# Patient Record
Sex: MALE | Race: WHITE | NOT HISPANIC OR LATINO
[De-identification: names, ages, dates, MRNs, and addresses within clinical notes are randomized per-mention and may not be internally consistent; named-entity substitution may affect disease eponyms.]

---
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
## 2021-05-25 ENCOUNTER — APPOINTMENT (OUTPATIENT)
Dept: PHYSICAL MEDICINE AND REHAB | Facility: CLINIC | Age: 21
End: 2021-05-25

## 2022-06-14 ENCOUNTER — APPOINTMENT (OUTPATIENT)
Dept: PHYSICAL MEDICINE AND REHAB | Facility: CLINIC | Age: 22
End: 2022-06-14

## 2022-12-19 ENCOUNTER — APPOINTMENT (OUTPATIENT)
Dept: PHYSICAL MEDICINE AND REHAB | Facility: CLINIC | Age: 22
End: 2022-12-19

## 2022-12-19 DIAGNOSIS — K59.2 NEUROGENIC BOWEL, NOT ELSEWHERE CLASSIFIED: ICD-10-CM

## 2022-12-19 DIAGNOSIS — G82.50 QUADRIPLEGIA, UNSPECIFIED: ICD-10-CM

## 2022-12-19 DIAGNOSIS — R25.2 CRAMP AND SPASM: ICD-10-CM

## 2022-12-19 DIAGNOSIS — N31.9 NEUROMUSCULAR DYSFUNCTION OF BLADDER, UNSPECIFIED: ICD-10-CM

## 2022-12-19 PROCEDURE — 99204 OFFICE O/P NEW MOD 45 MIN: CPT | Mod: 25

## 2022-12-19 PROCEDURE — 36415 COLL VENOUS BLD VENIPUNCTURE: CPT

## 2022-12-20 PROBLEM — N31.9 NEUROGENIC BLADDER: Status: ACTIVE | Noted: 2017-08-21

## 2022-12-20 LAB
ALBUMIN SERPL ELPH-MCNC: 4.6 G/DL
ALP BLD-CCNC: 50 U/L
ALT SERPL-CCNC: 28 U/L
ANION GAP SERPL CALC-SCNC: 9 MMOL/L
AST SERPL-CCNC: 23 U/L
BASOPHILS # BLD AUTO: 0.03 K/UL
BASOPHILS NFR BLD AUTO: 0.5 %
BILIRUB SERPL-MCNC: 0.7 MG/DL
BUN SERPL-MCNC: 14 MG/DL
CALCIUM SERPL-MCNC: 9.7 MG/DL
CHLORIDE SERPL-SCNC: 105 MMOL/L
CHOLEST SERPL-MCNC: 186 MG/DL
CO2 SERPL-SCNC: 24 MMOL/L
CREAT SERPL-MCNC: 0.75 MG/DL
EGFR: 132 ML/MIN/1.73M2
EOSINOPHIL # BLD AUTO: 0.05 K/UL
EOSINOPHIL NFR BLD AUTO: 0.8 %
GLUCOSE SERPL-MCNC: 93 MG/DL
HCT VFR BLD CALC: 43.4 %
HDLC SERPL-MCNC: 69 MG/DL
HGB BLD-MCNC: 14.4 G/DL
IMM GRANULOCYTES NFR BLD AUTO: 0.2 %
LDLC SERPL CALC-MCNC: 98 MG/DL
LYMPHOCYTES # BLD AUTO: 1.46 K/UL
LYMPHOCYTES NFR BLD AUTO: 24.8 %
MAN DIFF?: NORMAL
MCHC RBC-ENTMCNC: 29.3 PG
MCHC RBC-ENTMCNC: 33.2 GM/DL
MCV RBC AUTO: 88.2 FL
MONOCYTES # BLD AUTO: 0.4 K/UL
MONOCYTES NFR BLD AUTO: 6.8 %
NEUTROPHILS # BLD AUTO: 3.94 K/UL
NEUTROPHILS NFR BLD AUTO: 66.9 %
NONHDLC SERPL-MCNC: 116 MG/DL
PLATELET # BLD AUTO: 224 K/UL
POTASSIUM SERPL-SCNC: 4.1 MMOL/L
PROT SERPL-MCNC: 7.2 G/DL
RBC # BLD: 4.92 M/UL
RBC # FLD: 13.9 %
SODIUM SERPL-SCNC: 139 MMOL/L
TRIGL SERPL-MCNC: 92 MG/DL
TSH SERPL-ACNC: 0.71 UIU/ML
WBC # FLD AUTO: 5.89 K/UL

## 2022-12-20 RX ORDER — NORMAL SALT TABLETS 1 G/G
1 TABLET ORAL DAILY
Qty: 60 | Refills: 5 | Status: DISCONTINUED | COMMUNITY
Start: 2019-01-07 | End: 2022-12-20

## 2022-12-20 RX ORDER — D-MANNOSE 99 %
POWDER (GRAM) MISCELLANEOUS
Refills: 0 | Status: DISCONTINUED | COMMUNITY
Start: 2017-08-21 | End: 2022-12-20

## 2022-12-20 NOTE — HISTORY OF PRESENT ILLNESS
[FreeTextEntry1] : 22 year old man w/ C7 incomplete tetraplegia since age 14 presents for follow up. Since last visit in January of 2019, the patient has made remarkable strides in his emotional wellbeing. He states that he is happier than ever, including prior to his injury, owing to meditation, physical exercise and cutting negative influences out of his life. He completed 1 semester of college and is training for the paralympic games, recognizing that this lofty goal will require constant commitment. Physically, he has been relatively well in the past few years, and has made the necessary adjustments to maximize his wellbeing. He is essentially able to care for himself independently, and has not had any recent bladder or bowel problems. The only medication he remains on is baclofen, 40 mg twice daily. He did not feel that the neurogenic bladder regimen using both oxybutinin and mirabegron in the past was practical to continue as it only provided him with a minute or two of extra time to hold in urine prior to cathing. Over this span, he was once hospitalized with cellulitis and incidentally discovered to have COVID-19 at that time, both of which resolved and have not resurfaced.

## 2022-12-20 NOTE — SOCIAL HISTORY
[Brother] : brother [Private Home] : in a private home [Stairs to enter?] : no stairs to enter [Manual Wheelchair] : manual wheelchair [FreeTextEntry1] : lives in his childhood home with his younger brother.  [FreeTextEntry6] : Drives a modified vehicle.

## 2022-12-20 NOTE — REASON FOR VISIT
[Initial Eval - Existing Diagnosis] : an initial evaluation of an existing diagnosis [FreeTextEntry1] : tetraplegia.

## 2022-12-20 NOTE — PHYSICAL EXAM
[Right: _____] : Right: [unfilled] [Left: _____] : Left: [unfilled] [Normal] : Oriented to person, place, and time, insight and judgement were intact and the affect was normal [de-identified] : Fingers in flexion with good tenodesis. Transfers indep. Mat mob indep. WC indep.  [de-identified] : see below. Very spastic but uses body position to control and break strong spasms effectively.  [5] : C7  elbow extension 5/5 [0] : L3  knee extension 0/5 [1] : S1 ankle flexors 1/5

## 2022-12-20 NOTE — ASSESSMENT
[FreeTextEntry1] : 22 year old man with C7 AIS C tetraplegia.\par \par Praised for weight loss, psychological progress, engagement in paralympic endeavors \par \par 1. US kidney and bladder, eval for stones and hydro.\par 2. DEXA spine and hips. \par 3. CBC, BMP, TSH, lipids. \par 4. Continue with IC using  Speedicaths to maintain continence.\par 5. Encouraged to follow up on annual or semi-annual basis. \par 6. After informed consent administered flu vaccine left deltoid without reaction.\par 7. Social work eval to help with SSI and consider counseling. \par \par Add: labs normal. Patient informed.

## 2022-12-20 NOTE — END OF VISIT
[] : Resident [FreeTextEntry3] : I was physically present for key portion of history and physical exam and I directed medical management as documented by Dr. Franco and modified where appropriate.  [Time Spent: ___ minutes] : I have spent [unfilled] minutes of time on the encounter.

## 2022-12-20 NOTE — REVIEW OF SYSTEMS
[Fever] : no fever [Chills] : no chills [Chest Pain] : no chest pain [Palpitations] : no palpitations [Leg Claudication] : no intermittent leg claudication [Lower Ext Edema] : no lower extremity edema [Joint Pain] : no joint pain [Joint Stiffness] : no joint stiffness [Muscle Pain] : no muscle pain [Skin Rash] : no skin rash [Skin Wound] : no skin wound [Headache] : no headaches [Dizziness] : no dizziness [Negative] : Heme/Lymph [FreeTextEntry2] : Increased fitness. [FreeTextEntry5] : Orthostasis resolved, no longer uses salt tabs. [FreeTextEntry7] : BM every other day w/ digital stimulation, going well though routine takes a long time to ensure no bowel accident.  [FreeTextEntry8] : Self catheterizes 7-10 times per day.  [de-identified] : Stable incomplete tetraplegia. Dizziness reported in previous visits no longer bothersome. [de-identified] : Optimistic, positive mentality

## 2023-01-04 ENCOUNTER — NON-APPOINTMENT (OUTPATIENT)
Age: 23
End: 2023-01-04

## 2023-01-19 ENCOUNTER — APPOINTMENT (OUTPATIENT)
Dept: UROLOGY | Facility: CLINIC | Age: 23
End: 2023-01-19

## 2023-08-21 ENCOUNTER — EMERGENCY (EMERGENCY)
Facility: HOSPITAL | Age: 23
LOS: 1 days | Discharge: HOME | End: 2023-08-21
Attending: STUDENT IN AN ORGANIZED HEALTH CARE EDUCATION/TRAINING PROGRAM
Payer: COMMERCIAL

## 2023-08-21 VITALS
RESPIRATION RATE: 18 BRPM | HEART RATE: 88 BPM | DIASTOLIC BLOOD PRESSURE: 55 MMHG | WEIGHT: 169.98 LBS | SYSTOLIC BLOOD PRESSURE: 113 MMHG | OXYGEN SATURATION: 98 %

## 2023-08-21 DIAGNOSIS — S14.105A UNSPECIFIED INJURY AT C5 LEVEL OF CERVICAL SPINAL CORD, INITIAL ENCOUNTER: Chronic | ICD-10-CM

## 2023-08-21 LAB
ALBUMIN SERPL ELPH-MCNC: 4.3 G/DL — SIGNIFICANT CHANGE UP (ref 3.3–5)
ALP SERPL-CCNC: 57 U/L — SIGNIFICANT CHANGE UP (ref 40–120)
ALT FLD-CCNC: 23 U/L — SIGNIFICANT CHANGE UP (ref 10–45)
ANION GAP SERPL CALC-SCNC: 13 MMOL/L — SIGNIFICANT CHANGE UP (ref 5–17)
AST SERPL-CCNC: 22 U/L — SIGNIFICANT CHANGE UP (ref 10–40)
BASOPHILS # BLD AUTO: 0.02 K/UL — SIGNIFICANT CHANGE UP (ref 0–0.2)
BASOPHILS NFR BLD AUTO: 0.3 % — SIGNIFICANT CHANGE UP (ref 0–2)
BILIRUB SERPL-MCNC: 0.5 MG/DL — SIGNIFICANT CHANGE UP (ref 0.2–1.2)
BUN SERPL-MCNC: 15 MG/DL — SIGNIFICANT CHANGE UP (ref 7–23)
CALCIUM SERPL-MCNC: 9.6 MG/DL — SIGNIFICANT CHANGE UP (ref 8.4–10.5)
CHLORIDE SERPL-SCNC: 105 MMOL/L — SIGNIFICANT CHANGE UP (ref 96–108)
CO2 SERPL-SCNC: 22 MMOL/L — SIGNIFICANT CHANGE UP (ref 22–31)
CREAT SERPL-MCNC: 0.71 MG/DL — SIGNIFICANT CHANGE UP (ref 0.5–1.3)
CRP SERPL-MCNC: 16 MG/L — HIGH (ref 0–4)
EGFR: 133 ML/MIN/1.73M2 — SIGNIFICANT CHANGE UP
EOSINOPHIL # BLD AUTO: 0.09 K/UL — SIGNIFICANT CHANGE UP (ref 0–0.5)
EOSINOPHIL NFR BLD AUTO: 1.3 % — SIGNIFICANT CHANGE UP (ref 0–6)
GLUCOSE SERPL-MCNC: 109 MG/DL — HIGH (ref 70–99)
HCT VFR BLD CALC: 40.6 % — SIGNIFICANT CHANGE UP (ref 39–50)
HGB BLD-MCNC: 13.5 G/DL — SIGNIFICANT CHANGE UP (ref 13–17)
IMM GRANULOCYTES NFR BLD AUTO: 0.3 % — SIGNIFICANT CHANGE UP (ref 0–0.9)
LYMPHOCYTES # BLD AUTO: 1.83 K/UL — SIGNIFICANT CHANGE UP (ref 1–3.3)
LYMPHOCYTES # BLD AUTO: 27.1 % — SIGNIFICANT CHANGE UP (ref 13–44)
MCHC RBC-ENTMCNC: 28.8 PG — SIGNIFICANT CHANGE UP (ref 27–34)
MCHC RBC-ENTMCNC: 33.3 GM/DL — SIGNIFICANT CHANGE UP (ref 32–36)
MCV RBC AUTO: 86.8 FL — SIGNIFICANT CHANGE UP (ref 80–100)
MONOCYTES # BLD AUTO: 0.63 K/UL — SIGNIFICANT CHANGE UP (ref 0–0.9)
MONOCYTES NFR BLD AUTO: 9.3 % — SIGNIFICANT CHANGE UP (ref 2–14)
NEUTROPHILS # BLD AUTO: 4.17 K/UL — SIGNIFICANT CHANGE UP (ref 1.8–7.4)
NEUTROPHILS NFR BLD AUTO: 61.7 % — SIGNIFICANT CHANGE UP (ref 43–77)
NRBC # BLD: 0 /100 WBCS — SIGNIFICANT CHANGE UP (ref 0–0)
PLATELET # BLD AUTO: 213 K/UL — SIGNIFICANT CHANGE UP (ref 150–400)
POTASSIUM SERPL-MCNC: 4.2 MMOL/L — SIGNIFICANT CHANGE UP (ref 3.5–5.3)
POTASSIUM SERPL-SCNC: 4.2 MMOL/L — SIGNIFICANT CHANGE UP (ref 3.5–5.3)
PROT SERPL-MCNC: 7 G/DL — SIGNIFICANT CHANGE UP (ref 6–8.3)
RBC # BLD: 4.68 M/UL — SIGNIFICANT CHANGE UP (ref 4.2–5.8)
RBC # FLD: 13.8 % — SIGNIFICANT CHANGE UP (ref 10.3–14.5)
SODIUM SERPL-SCNC: 140 MMOL/L — SIGNIFICANT CHANGE UP (ref 135–145)
WBC # BLD: 6.76 K/UL — SIGNIFICANT CHANGE UP (ref 3.8–10.5)
WBC # FLD AUTO: 6.76 K/UL — SIGNIFICANT CHANGE UP (ref 3.8–10.5)

## 2023-08-21 PROCEDURE — 76377 3D RENDER W/INTRP POSTPROCES: CPT | Mod: 26

## 2023-08-21 PROCEDURE — 73610 X-RAY EXAM OF ANKLE: CPT | Mod: 26,RT,76

## 2023-08-21 PROCEDURE — 99284 EMERGENCY DEPT VISIT MOD MDM: CPT

## 2023-08-21 PROCEDURE — 73080 X-RAY EXAM OF ELBOW: CPT | Mod: 26,RT

## 2023-08-21 PROCEDURE — 73620 X-RAY EXAM OF FOOT: CPT | Mod: 26,RT,76

## 2023-08-21 PROCEDURE — 73700 CT LOWER EXTREMITY W/O DYE: CPT | Mod: 26,50,MA

## 2023-08-21 RX ORDER — SODIUM CHLORIDE 9 MG/ML
1000 INJECTION INTRAMUSCULAR; INTRAVENOUS; SUBCUTANEOUS ONCE
Refills: 0 | Status: COMPLETED | OUTPATIENT
Start: 2023-08-21 | End: 2023-08-21

## 2023-08-21 RX ORDER — ACETAMINOPHEN 500 MG
650 TABLET ORAL ONCE
Refills: 0 | Status: COMPLETED | OUTPATIENT
Start: 2023-08-21 | End: 2023-08-21

## 2023-08-21 RX ADMIN — Medication 650 MILLIGRAM(S): at 17:40

## 2023-08-21 RX ADMIN — SODIUM CHLORIDE 1000 MILLILITER(S): 9 INJECTION INTRAMUSCULAR; INTRAVENOUS; SUBCUTANEOUS at 21:50
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
## 2023-08-21 NOTE — ED PROVIDER NOTE - PROGRESS NOTE DETAILS
James PGY3  Patient signed out to me pending CT lower extremity which did not show acute fracture or dislocation. Discussed results with patient - agreeable with discharge and recommends PMD follow up. Prescription of keflex sent to pharmacy.
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
## 2023-08-21 NOTE — ED PROVIDER NOTE - NSFOLLOWUPINSTRUCTIONS_ED_ALL_ED_FT
You were seen in the emergency department for foot injury.   Your workup in the emergency department includes bloodwork and CT scan.   You can find the results of all the tests in this discharge packet.   Please follow up with your primary care doctor within 48 hours for continuation of care.     Return to the emergency department if you experience any new/concerning/worsening symptoms such as but not limited to: fever (>100.3F), intractable nausea, vomiting, chest pain, shortness of breath, abdominal pain, worsening pain.     You are prescribed:  1) Cephalexin (keflex): take 500mg every 6 hours for 5 days
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
## 2023-08-21 NOTE — ED PROVIDER NOTE - NS ED ROS FT
Constitutional: No fever or chills  Eyes: No visual changes, eye pain or redness  HEENT: No throat pain, ear pain, nasal pain. No nose bleeding.  CV: No chest pain or lower extremity edema  Resp: No SOB no cough  GI: No abd pain. No nausea or vomiting. No diarrhea. No constipation.   : No dysuria, hematuria.   MSK: ++musculoskeletal pain  Skin: No rash  Neuro: No headache. No numbness or tingling. No weakness.

## 2023-08-21 NOTE — ED PROVIDER NOTE - PATIENT PORTAL LINK FT
You can access the FollowMyHealth Patient Portal offered by [facility] by registering at the following website: [email or web address]. By joining [facility]’s FollowMyHealth portal, you will also be able to view your health information using other applications (apps) compatible with our system.

## 2023-08-21 NOTE — ED PROVIDER NOTE - NSICDXPASTSURGICALHX_GEN_ALL_CORE_FT
PAST SURGICAL HISTORY:  C5-C7 level spinal cord injury s/p C5-C6 corpectomy with posterior stabilization

## 2023-08-21 NOTE — ED ADULT NURSE NOTE - NSFALLRISKINTERV_ED_ALL_ED

## 2023-08-21 NOTE — ED ADULT NURSE NOTE - CHPI ED NUR SYMPTOMS NEG
no acting out behaviors/no crying/no decreased eating/drinking/no disorientation/no dizziness/no fussiness/no headache/no laceration/no loss of consciousness/no neck tenderness/no sleeping issues
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
## 2023-08-21 NOTE — ED PROVIDER NOTE - PHYSICAL EXAMINATION
Gen: AAO x 3, NAD  Skin: diffuse swelling and redness to the L foot, worse on the lateral aspect. 2+ pulses   HEENT: NC/AT, PERRLA, EOMI, MMM  Resp: unlabored CTAB  Cardiac: rrr s1s2  GI: ND, +BS, Soft, NT  Neuro: no focal deficits

## 2023-08-21 NOTE — ED PROVIDER NOTE - NSICDXFAMILYHX_GEN_ALL_CORE_FT
FAMILY HISTORY:  Mother  Still living? Unknown  Family history of stroke, Age at diagnosis: Age Unknown    Grandparent  Still living? Unknown  Family history of myocardial infarction, Age at diagnosis: Age Unknown
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
## 2023-08-21 NOTE — ED PROVIDER NOTE - NS ED ATTENDING STATEMENT MOD
Attending with This was a shared visit with the [facility]. I reviewed and verified the documentation and independently performed the documented:

## 2023-08-22 VITALS
TEMPERATURE: 98 F | DIASTOLIC BLOOD PRESSURE: 61 MMHG | SYSTOLIC BLOOD PRESSURE: 105 MMHG | HEART RATE: 54 BPM | OXYGEN SATURATION: 100 % | RESPIRATION RATE: 18 BRPM

## 2023-08-22 LAB — ERYTHROCYTE [SEDIMENTATION RATE] IN BLOOD: 16 MM/HR — HIGH (ref 0–15)

## 2023-08-22 PROCEDURE — 86140 C-REACTIVE PROTEIN: CPT

## 2023-08-22 PROCEDURE — 73080 X-RAY EXAM OF ELBOW: CPT

## 2023-08-22 PROCEDURE — 99284 EMERGENCY DEPT VISIT MOD MDM: CPT | Mod: 25

## 2023-08-22 PROCEDURE — 73700 CT LOWER EXTREMITY W/O DYE: CPT | Mod: MA

## 2023-08-22 PROCEDURE — 85025 COMPLETE CBC W/AUTO DIFF WBC: CPT

## 2023-08-22 PROCEDURE — 80053 COMPREHEN METABOLIC PANEL: CPT

## 2023-08-22 PROCEDURE — 82550 ASSAY OF CK (CPK): CPT

## 2023-08-22 PROCEDURE — 85652 RBC SED RATE AUTOMATED: CPT

## 2023-08-22 PROCEDURE — 73610 X-RAY EXAM OF ANKLE: CPT

## 2023-08-22 PROCEDURE — 76377 3D RENDER W/INTRP POSTPROCES: CPT

## 2023-08-22 PROCEDURE — 73620 X-RAY EXAM OF FOOT: CPT

## 2023-08-22 RX ORDER — CEPHALEXIN 500 MG
500 CAPSULE ORAL ONCE
Refills: 0 | Status: COMPLETED | OUTPATIENT
Start: 2023-08-22 | End: 2023-08-22

## 2023-08-22 RX ORDER — CEPHALEXIN 500 MG
1 CAPSULE ORAL
Qty: 20 | Refills: 0
Start: 2023-08-22 | End: 2023-08-26

## 2023-08-22 RX ADMIN — Medication 500 MILLIGRAM(S): at 01:15

## 2025-03-18 ENCOUNTER — APPOINTMENT (OUTPATIENT)
Dept: PHYSICAL MEDICINE AND REHAB | Facility: CLINIC | Age: 25
End: 2025-03-18

## 2025-06-25 NOTE — ED PROVIDER NOTE - IV ALTEPLASE INCLUSION HIDDEN
It was a pleasure to see you today.  You presented to the clinic for back and left leg pain.     Plan:  - Stop ibuprofen. Take Tylenol instead. Take OTC Tylenol 1000mg up to four times daily. Do not exceed more four times daily or 4g total Tylenol  - Start gabapentin 300mg three times a day. Start with 1 capsule nightly. If you are not too sleepy, increase to twice daily or three times daily.  - Referral to PT and pain management    Follow up appointments:  Return in about 1 month (around 7/25/2025) for Back pain, sciatica and depression.     Health Maintenance Due  Health Maintenance Due   Topic Date Due    Pneumococcal Vaccine 50+ (1 of 2 - PCV) Never done    Osteoporosis Screening  Never done    Respiratory Syncytial Virus (RSV) Vaccine 60+ (1 - 1-dose 75+ series) Never done    COVID-19 Vaccine (1 - 2024-25 season) Never done    Traditional Medicare- Medicare Wellness Visit  11/01/2025       Further Instructions:  Please schedule a follow-up with us today before you leave.     For any labs completed today, the results can be accessed through the patient portal on the [employer] [place].  Please sign up!      You may see your test results before I do.  I will be contacting you regarding their interpretation.    If you are unable to get labs completed today, the lab is open Monday through Friday from 8 am to 4:00 pm.  Call to schedule a lab visit.  Please fast at least 8 hours prior to your lab draw if having cholesterol checked.    If you require an imaging study and need to schedule an appointment, call central scheduling at [phone] to schedule all appointments.  Please ask for the location address at the time of the phone call.    If you require a referral to see a specialist, our staff will complete this on your behalf and provide you with the referral prior to you leaving.    Jerad Noriega, DO    show